# Patient Record
Sex: FEMALE | Race: WHITE | NOT HISPANIC OR LATINO | Employment: PART TIME | URBAN - NONMETROPOLITAN AREA
[De-identification: names, ages, dates, MRNs, and addresses within clinical notes are randomized per-mention and may not be internally consistent; named-entity substitution may affect disease eponyms.]

---

## 2024-09-12 ENCOUNTER — HOSPITAL ENCOUNTER (EMERGENCY)
Facility: HOSPITAL | Age: 64
Discharge: HOME | End: 2024-09-12
Attending: FAMILY MEDICINE
Payer: COMMERCIAL

## 2024-09-12 ENCOUNTER — APPOINTMENT (OUTPATIENT)
Dept: RADIOLOGY | Facility: HOSPITAL | Age: 64
End: 2024-09-12
Payer: COMMERCIAL

## 2024-09-12 VITALS
DIASTOLIC BLOOD PRESSURE: 83 MMHG | OXYGEN SATURATION: 99 % | SYSTOLIC BLOOD PRESSURE: 151 MMHG | HEIGHT: 64 IN | WEIGHT: 139.55 LBS | HEART RATE: 77 BPM | BODY MASS INDEX: 23.82 KG/M2 | TEMPERATURE: 98.4 F | RESPIRATION RATE: 15 BRPM

## 2024-09-12 DIAGNOSIS — R25.2 MUSCLE CRAMP: Primary | ICD-10-CM

## 2024-09-12 PROCEDURE — 99284 EMERGENCY DEPT VISIT MOD MDM: CPT | Mod: 25

## 2024-09-12 PROCEDURE — 93971 EXTREMITY STUDY: CPT | Performed by: STUDENT IN AN ORGANIZED HEALTH CARE EDUCATION/TRAINING PROGRAM

## 2024-09-12 PROCEDURE — 93971 EXTREMITY STUDY: CPT

## 2024-09-12 RX ORDER — ASPIRIN 81 MG/1
1 TABLET ORAL DAILY
COMMUNITY
Start: 2014-06-11

## 2024-09-12 RX ORDER — ALPRAZOLAM 0.5 MG/1
0.5 TABLET ORAL 3 TIMES DAILY PRN
COMMUNITY
Start: 2024-06-14

## 2024-09-12 RX ORDER — ALBUTEROL SULFATE 90 UG/1
2 INHALANT RESPIRATORY (INHALATION) 4 TIMES DAILY
COMMUNITY
Start: 2015-08-11

## 2024-09-12 RX ORDER — TENOFOVIR ALAFENAMIDE 25 MG/1
25 TABLET ORAL
COMMUNITY
Start: 2024-09-06

## 2024-09-12 RX ORDER — CHOLECALCIFEROL (VITAMIN D3) 125 MCG
5000 CAPSULE ORAL
COMMUNITY
Start: 2024-09-03

## 2024-09-12 RX ORDER — BUDESONIDE AND FORMOTEROL FUMARATE DIHYDRATE 160; 4.5 UG/1; UG/1
2 AEROSOL RESPIRATORY (INHALATION) 2 TIMES DAILY
COMMUNITY
Start: 2024-04-22

## 2024-09-12 RX ORDER — NALOXONE HYDROCHLORIDE 4 MG/.1ML
4 SPRAY NASAL AS NEEDED
COMMUNITY
Start: 2023-02-27

## 2024-09-12 RX ORDER — ROSUVASTATIN CALCIUM 10 MG/1
10 TABLET, COATED ORAL
COMMUNITY
Start: 2024-07-23 | End: 2025-07-23

## 2024-09-12 RX ORDER — ONDANSETRON 4 MG/1
4 TABLET, FILM COATED ORAL EVERY 8 HOURS PRN
COMMUNITY
Start: 2024-03-12

## 2024-09-12 RX ORDER — LISINOPRIL 20 MG/1
20 TABLET ORAL
COMMUNITY
Start: 2024-07-23 | End: 2025-07-23

## 2024-09-12 RX ORDER — PANTOPRAZOLE SODIUM 40 MG/1
40 TABLET, DELAYED RELEASE ORAL
COMMUNITY
Start: 2024-07-23

## 2024-09-12 RX ORDER — LORATADINE 10 MG/1
10 TABLET ORAL DAILY PRN
COMMUNITY

## 2024-09-12 ASSESSMENT — PAIN - FUNCTIONAL ASSESSMENT: PAIN_FUNCTIONAL_ASSESSMENT: 0-10

## 2024-09-12 ASSESSMENT — COLUMBIA-SUICIDE SEVERITY RATING SCALE - C-SSRS
1. IN THE PAST MONTH, HAVE YOU WISHED YOU WERE DEAD OR WISHED YOU COULD GO TO SLEEP AND NOT WAKE UP?: NO
2. HAVE YOU ACTUALLY HAD ANY THOUGHTS OF KILLING YOURSELF?: NO
6. HAVE YOU EVER DONE ANYTHING, STARTED TO DO ANYTHING, OR PREPARED TO DO ANYTHING TO END YOUR LIFE?: NO

## 2024-09-12 ASSESSMENT — PAIN DESCRIPTION - LOCATION: LOCATION: LEG

## 2024-09-12 ASSESSMENT — PAIN SCALES - GENERAL: PAINLEVEL_OUTOF10: 1

## 2024-09-12 ASSESSMENT — PAIN DESCRIPTION - ORIENTATION: ORIENTATION: RIGHT

## 2024-09-12 ASSESSMENT — PAIN DESCRIPTION - DESCRIPTORS: DESCRIPTORS: ACHING

## 2024-09-12 ASSESSMENT — PAIN DESCRIPTION - PAIN TYPE: TYPE: ACUTE PAIN

## 2024-09-13 NOTE — ED PROVIDER NOTES
HPI   Chief Complaint   Patient presents with    Leg Swelling     Rt calf pain and swelling began today       64-year-old female comes ED with complaint of right calf cramping and swelling that occurred earlier this morning.  Patient reports she felt like she had a charley horse, eventually it went away but then reoccurred.  Patient reports she was concerned after talking with family that it could be a blood clot and she came to the ED.  Patient reports he did nothing to treat the swelling cramping and they generally went away on their own with rest.  Patient reports no other associated symptoms or complaints.  Patient in the ED is alert, cooperative, appears anxious, comfortable, and in no distress.      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    Lipids abnormal    Other specified soft tissue disorders 04/03/2018    Swelling of finger, right    Pediculosis due to pediculus humanus capitis 08/10/2017    Pediculosis capitis    Personal history of diseases of the skin and subcutaneous tissue 12/29/2014    History of folliculitis    Personal history of other diseases of the circulatory system 01/16/2019    History of carotid artery stenosis    Personal history of other diseases of the musculoskeletal system and connective tissue     History of backache    Personal history of other diseases of the nervous system and sense organs 08/01/2017    History of blepharitis    Personal history of other diseases of the nervous system and sense organs 08/11/2015     History of acute conjunctivitis    Personal history of other diseases of the respiratory system 2019    History of sore throat    Personal history of other diseases of the respiratory system 2019    History of nasal discharge    Personal history of other diseases of the respiratory system     History of chronic obstructive lung disease    Personal history of other diseases of the respiratory system     History of upper respiratory infection    Personal history of other mental and behavioral disorders     History of depression    Personal history of other specified conditions 2017    History of vomiting    Personal history of other specified conditions     History of headache    Personal history of pneumonia (recurrent)     History of pneumonia    Post-traumatic stress disorder, unspecified     PTSD (post-traumatic stress disorder)    Right upper quadrant pain 2017    Abdominal pain, acute, right upper quadrant     Past Surgical History:   Procedure Laterality Date    BREAST BIOPSY  2014    Biopsy Breast Percutaneous Needle Core     SECTION, CLASSIC  2014     Section    OTHER SURGICAL HISTORY  2018    Mass excision    OTHER SURGICAL HISTORY  2018    Shoulder surgery    TONSILLECTOMY  2014    Tonsillectomy With Adenoidectomy    TUBAL LIGATION  2017    Tubal Ligation     No family history on file.  Social History     Tobacco Use    Smoking status: Every Day     Current packs/day: 0.50     Types: Cigarettes    Smokeless tobacco: Never   Vaping Use    Vaping status: Some Days   Substance Use Topics    Alcohol use: Never    Drug use: Yes     Types: Marijuana       Physical Exam   ED Triage Vitals [24 1438]   Temperature Heart Rate Respirations BP   36.9 °C (98.4 °F) 71 18 138/79      SpO2 Temp Source Heart Rate Source Patient Position   97 % Temporal -- --      BP Location FiO2 (%)     -- --       Physical Exam  Vitals and nursing note  reviewed.   Constitutional:       General: She is not in acute distress.     Appearance: She is well-developed.   HENT:      Head: Normocephalic and atraumatic.   Eyes:      Conjunctiva/sclera: Conjunctivae normal.   Cardiovascular:      Rate and Rhythm: Normal rate and regular rhythm.      Pulses: Normal pulses.      Heart sounds: Normal heart sounds, S1 normal and S2 normal. No murmur heard.  Pulmonary:      Effort: Pulmonary effort is normal. No respiratory distress.      Breath sounds: Normal breath sounds.   Abdominal:      Palpations: Abdomen is soft.      Tenderness: There is no abdominal tenderness.   Musculoskeletal:         General: No swelling.      Cervical back: Neck supple.      Right knee: Normal.      Right lower leg: Normal.      Right ankle: Normal.      Right foot: Normal.      Comments: Mild right calf tenderness, no swelling, no redness, no bruising  Good range of motion and good sensation with good pulses   Skin:     General: Skin is warm and dry.      Capillary Refill: Capillary refill takes less than 2 seconds.   Neurological:      Mental Status: She is alert.   Psychiatric:         Mood and Affect: Mood normal.           ED Course & MDM   Diagnoses as of 09/12/24 2054   Muscle cramp                 No data recorded     [place] Coma Scale Score: 15 (09/12/24 1442 : Sandra West, JET)                       Lower extremity venous duplex right   Final Result   1. Negative study.  No acute right lower extremity deep venous   thrombosis.        MACRO:   None        Signed by: Quentin Velazquez 9/12/2024 3:44 PM   Dictation workstation:   [id number]            Medical Decision Making  Pt upon arrival to the ED appeared to be anxious but comfortable and in no distress with stable vitals.  Discussed with pt the presenting complaints and clinically findings.  Reviewed with pt the epic chart and counseled the patient on lower leg pain/swelling and appropriate approach to management/treatments.  After  assessment and evaluation ice was applied to lower leg, imaging ordered, and patient observed.  Upon reevaluation patient continued to have no limitation range of motion and continues to be able to stand and ambulate well with no difficulties.  Patient is continue have no new physical complaints and vital signs continue be appropriate.  Final imaging results were reviewed discussed with patient and at this time findings are negative for DVT and was felt patient's pain was secondary to a muscle spasm.  Patient was educated on management/supportive care and encouraged her to contact her primary care doctor for follow-up recheck in several days.  Patient stable and discharged home.    Amount and/or Complexity of Data Reviewed  External Data Reviewed: labs, radiology and notes.  Radiology: ordered. Decision-making details documented in ED Course.    Risk  OTC drugs.        Procedure  Procedures     Frantz Brambila MD  09/12/24 9295

## 2025-06-12 ENCOUNTER — PATIENT OUTREACH (OUTPATIENT)
Dept: PRIMARY CARE | Facility: CLINIC | Age: 65
End: 2025-06-12
Payer: COMMERCIAL

## 2025-06-12 DIAGNOSIS — Z12.12 SCREENING FOR COLORECTAL CANCER: ICD-10-CM

## 2025-06-12 DIAGNOSIS — Z12.11 SCREENING FOR COLORECTAL CANCER: ICD-10-CM
